# Patient Record
Sex: FEMALE | Race: WHITE | NOT HISPANIC OR LATINO | Employment: OTHER | URBAN - NONMETROPOLITAN AREA
[De-identification: names, ages, dates, MRNs, and addresses within clinical notes are randomized per-mention and may not be internally consistent; named-entity substitution may affect disease eponyms.]

---

## 2021-04-02 ENCOUNTER — OFFICE VISIT (OUTPATIENT)
Dept: SURGERY | Facility: CLINIC | Age: 61
End: 2021-04-02

## 2021-04-02 VITALS
TEMPERATURE: 98.4 F | SYSTOLIC BLOOD PRESSURE: 146 MMHG | HEIGHT: 65 IN | WEIGHT: 293 LBS | OXYGEN SATURATION: 94 % | DIASTOLIC BLOOD PRESSURE: 82 MMHG | HEART RATE: 116 BPM | BODY MASS INDEX: 48.82 KG/M2

## 2021-04-02 DIAGNOSIS — M67.40 GANGLION CYST: Primary | ICD-10-CM

## 2021-04-02 PROCEDURE — 99203 OFFICE O/P NEW LOW 30 MIN: CPT | Performed by: SURGERY

## 2021-04-02 RX ORDER — ESCITALOPRAM OXALATE 10 MG/1
TABLET ORAL
COMMUNITY

## 2021-04-02 RX ORDER — LIRAGLUTIDE 6 MG/ML
INJECTION, SOLUTION SUBCUTANEOUS
COMMUNITY

## 2021-04-02 RX ORDER — PILOCARPINE HYDROCHLORIDE 5 MG/1
TABLET, FILM COATED ORAL
COMMUNITY

## 2021-04-02 RX ORDER — SULFAMETHOXAZOLE AND TRIMETHOPRIM 800; 160 MG/1; MG/1
TABLET ORAL
COMMUNITY
Start: 2021-03-24

## 2021-04-02 RX ORDER — METHYLPREDNISOLONE 4 MG/1
TABLET ORAL
COMMUNITY
Start: 2021-02-15

## 2021-04-02 RX ORDER — SYRINGE AND NEEDLE,INSULIN,1ML 25GX1"
SYRINGE, EMPTY DISPOSABLE MISCELLANEOUS
COMMUNITY
Start: 2021-03-26

## 2021-04-02 RX ORDER — FUROSEMIDE 40 MG/1
TABLET ORAL
COMMUNITY

## 2021-04-02 RX ORDER — CETIRIZINE HYDROCHLORIDE 10 MG/1
TABLET ORAL
COMMUNITY

## 2021-04-02 RX ORDER — FOLIC ACID 1 MG/1
TABLET ORAL
COMMUNITY

## 2021-04-02 RX ORDER — ALPRAZOLAM 0.25 MG/1
TABLET ORAL
COMMUNITY

## 2021-04-02 RX ORDER — MONTELUKAST SODIUM 10 MG/1
TABLET ORAL
COMMUNITY

## 2021-04-02 RX ORDER — PREDNISONE 10 MG/1
TABLET ORAL
COMMUNITY
Start: 2021-01-04

## 2021-04-02 RX ORDER — HYDROXYCHLOROQUINE SULFATE 200 MG/1
TABLET, FILM COATED ORAL
COMMUNITY
Start: 2021-01-17

## 2021-04-02 RX ORDER — ESCITALOPRAM OXALATE 20 MG/1
TABLET ORAL
COMMUNITY

## 2021-04-02 RX ORDER — LOSARTAN POTASSIUM 50 MG/1
TABLET ORAL
COMMUNITY

## 2021-04-02 RX ORDER — NALTREXONE HYDROCHLORIDE AND BUPROPION HYDROCHLORIDE 8; 90 MG/1; MG/1
TABLET, EXTENDED RELEASE ORAL
COMMUNITY

## 2021-04-02 RX ORDER — CHOLECALCIFEROL (VITAMIN D3) 125 MCG
CAPSULE ORAL
COMMUNITY

## 2021-04-02 RX ORDER — METHOTREXATE 25 MG/ML
INJECTION INTRA-ARTERIAL; INTRAMUSCULAR; INTRATHECAL; INTRAVENOUS
COMMUNITY
Start: 2021-02-20

## 2021-04-02 RX ORDER — ATORVASTATIN CALCIUM 40 MG/1
TABLET, FILM COATED ORAL
COMMUNITY

## 2021-04-02 RX ORDER — INFLIXIMAB 100 MG/10ML
5 INJECTION, POWDER, LYOPHILIZED, FOR SOLUTION INTRAVENOUS
COMMUNITY

## 2021-06-02 ENCOUNTER — TRANSCRIBE ORDERS (OUTPATIENT)
Dept: ADMINISTRATIVE | Facility: HOSPITAL | Age: 61
End: 2021-06-02

## 2021-06-02 DIAGNOSIS — Z12.31 ENCOUNTER FOR SCREENING MAMMOGRAM FOR MALIGNANT NEOPLASM OF BREAST: Primary | ICD-10-CM

## 2021-07-15 ENCOUNTER — HOSPITAL ENCOUNTER (OUTPATIENT)
Dept: MAMMOGRAPHY | Facility: HOSPITAL | Age: 61
Discharge: HOME | End: 2021-07-15
Admitting: INTERNAL MEDICINE

## 2021-07-15 DIAGNOSIS — Z12.31 ENCOUNTER FOR SCREENING MAMMOGRAM FOR MALIGNANT NEOPLASM OF BREAST: ICD-10-CM

## 2021-07-15 PROCEDURE — 77067 SCR MAMMO BI INCL CAD: CPT

## 2021-07-15 PROCEDURE — 77063 BREAST TOMOSYNTHESIS BI: CPT

## 2022-10-03 ENCOUNTER — TRANSCRIBE ORDERS (OUTPATIENT)
Dept: ADMINISTRATIVE | Facility: HOSPITAL | Age: 62
End: 2022-10-03

## 2022-10-03 DIAGNOSIS — Z12.31 ENCOUNTER FOR SCREENING MAMMOGRAM FOR BREAST CANCER: Primary | ICD-10-CM

## 2023-05-09 ENCOUNTER — TRANSCRIBE ORDERS (OUTPATIENT)
Dept: ADMINISTRATIVE | Facility: HOSPITAL | Age: 63
End: 2023-05-09
Payer: COMMERCIAL

## 2023-05-09 DIAGNOSIS — N18.31 CHRONIC KIDNEY DISEASE (CKD) STAGE G3A/A1, MODERATELY DECREASED GLOMERULAR FILTRATION RATE (GFR) BETWEEN 45-59 ML/MIN/1.73 SQUARE METER AND ALBUMINURIA CREATININE RATIO LESS THAN 30 MG/G (CMS/H*: Primary | ICD-10-CM

## 2024-04-01 ENCOUNTER — TRANSCRIBE ORDERS (OUTPATIENT)
Dept: ADMINISTRATIVE | Facility: HOSPITAL | Age: 64
End: 2024-04-01
Payer: COMMERCIAL

## 2024-04-01 DIAGNOSIS — Z12.31 VISIT FOR SCREENING MAMMOGRAM: Primary | ICD-10-CM

## 2024-07-05 ENCOUNTER — HOSPITAL ENCOUNTER (OUTPATIENT)
Dept: MAMMOGRAPHY | Facility: HOSPITAL | Age: 64
Discharge: HOME | End: 2024-07-05
Admitting: INTERNAL MEDICINE
Payer: COMMERCIAL

## 2024-07-05 DIAGNOSIS — Z12.31 VISIT FOR SCREENING MAMMOGRAM: ICD-10-CM

## 2024-07-05 PROCEDURE — 77063 BREAST TOMOSYNTHESIS BI: CPT

## 2024-07-05 PROCEDURE — 77067 SCR MAMMO BI INCL CAD: CPT

## 2024-07-08 ENCOUNTER — TRANSCRIBE ORDERS (OUTPATIENT)
Dept: ADMINISTRATIVE | Facility: HOSPITAL | Age: 64
End: 2024-07-08
Payer: COMMERCIAL

## 2024-07-08 DIAGNOSIS — R92.8 ABNORMAL MAMMOGRAM: Primary | ICD-10-CM

## 2024-08-09 ENCOUNTER — HOSPITAL ENCOUNTER (OUTPATIENT)
Dept: MAMMOGRAPHY | Facility: HOSPITAL | Age: 64
Discharge: HOME | End: 2024-08-09
Admitting: INTERNAL MEDICINE
Payer: COMMERCIAL

## 2024-08-09 DIAGNOSIS — R92.8 ABNORMAL MAMMOGRAM: ICD-10-CM

## 2024-08-09 PROCEDURE — 77065 DX MAMMO INCL CAD UNI: CPT

## 2024-08-09 PROCEDURE — G0279 TOMOSYNTHESIS, MAMMO: HCPCS

## 2024-08-21 RX ORDER — HYDROXYCHLOROQUINE SULFATE 200 MG/1
200 TABLET, FILM COATED ORAL 2 TIMES DAILY
Qty: 60 TABLET | Refills: 3 | OUTPATIENT
Start: 2024-08-21

## 2024-12-23 RX ORDER — TRAMADOL HYDROCHLORIDE 50 MG/1
TABLET ORAL
Qty: 120 TABLET | OUTPATIENT
Start: 2024-12-23

## 2024-12-23 NOTE — TELEPHONE ENCOUNTER
Please call and schedule pt a F/U so we can refill RX. Please let us know in the clinical pool when you do.

## 2024-12-23 NOTE — TELEPHONE ENCOUNTER
I BELIEVE THIS PT SWITCHED RHEUMATOLOGIST.  THERE IS A BELLA FOR RECORDS TO GO TO [facility] FOR DR. FRANKLYN MURO.

## 2025-06-27 ENCOUNTER — OFFICE VISIT (OUTPATIENT)
Age: 65
End: 2025-06-27
Payer: COMMERCIAL

## 2025-06-27 VITALS
WEIGHT: 293 LBS | HEIGHT: 65 IN | SYSTOLIC BLOOD PRESSURE: 125 MMHG | TEMPERATURE: 98.2 F | DIASTOLIC BLOOD PRESSURE: 75 MMHG | OXYGEN SATURATION: 99 % | HEART RATE: 61 BPM | RESPIRATION RATE: 18 BRPM | BODY MASS INDEX: 48.82 KG/M2

## 2025-06-27 DIAGNOSIS — J01.00 ACUTE NON-RECURRENT MAXILLARY SINUSITIS: ICD-10-CM

## 2025-06-27 DIAGNOSIS — I10 PRIMARY HYPERTENSION: ICD-10-CM

## 2025-06-27 DIAGNOSIS — E55.9 VITAMIN D DEFICIENCY: ICD-10-CM

## 2025-06-27 DIAGNOSIS — Z90.3 HISTORY OF SLEEVE GASTRECTOMY: ICD-10-CM

## 2025-06-27 DIAGNOSIS — E66.9 OBESITY WITHOUT SERIOUS COMORBIDITY, UNSPECIFIED CLASS, UNSPECIFIED OBESITY TYPE: ICD-10-CM

## 2025-06-27 DIAGNOSIS — F41.8 MIXED ANXIETY AND DEPRESSIVE DISORDER: Primary | ICD-10-CM

## 2025-06-27 DIAGNOSIS — J30.9 ALLERGIC RHINITIS, UNSPECIFIED SEASONALITY, UNSPECIFIED TRIGGER: ICD-10-CM

## 2025-06-27 DIAGNOSIS — E78.2 MIXED HYPERLIPIDEMIA: ICD-10-CM

## 2025-06-27 DIAGNOSIS — M35.01 SJOGREN'S SYNDROME WITH KERATOCONJUNCTIVITIS SICCA: ICD-10-CM

## 2025-06-27 DIAGNOSIS — R73.03 PREDIABETES: ICD-10-CM

## 2025-06-27 DIAGNOSIS — D84.821 IMMUNOSUPPRESSION DUE TO DRUG THERAPY: ICD-10-CM

## 2025-06-27 DIAGNOSIS — G47.33 OBSTRUCTIVE SLEEP APNEA SYNDROME: ICD-10-CM

## 2025-06-27 DIAGNOSIS — R92.8 ABNORMAL MAMMOGRAM: ICD-10-CM

## 2025-06-27 DIAGNOSIS — Z79.899 IMMUNOSUPPRESSION DUE TO DRUG THERAPY: ICD-10-CM

## 2025-06-27 DIAGNOSIS — J45.20 MILD INTERMITTENT ASTHMA, UNSPECIFIED WHETHER COMPLICATED: ICD-10-CM

## 2025-06-27 DIAGNOSIS — N18.31 STAGE 3A CHRONIC KIDNEY DISEASE: ICD-10-CM

## 2025-06-27 PROBLEM — J45.909 ASTHMA: Status: ACTIVE | Noted: 2024-11-13

## 2025-06-27 PROBLEM — M35.00 SJOGREN'S SYNDROME: Status: ACTIVE | Noted: 2018-12-17

## 2025-06-27 PROBLEM — E78.5 HYPERLIPIDEMIA: Status: ACTIVE | Noted: 2020-11-25

## 2025-06-27 RX ORDER — FAMOTIDINE 40 MG/1
40 TABLET, FILM COATED ORAL DAILY
COMMUNITY

## 2025-06-27 RX ORDER — LEVOFLOXACIN 750 MG/1
750 TABLET, FILM COATED ORAL DAILY
Qty: 7 TABLET | Refills: 0 | Status: SHIPPED | OUTPATIENT
Start: 2025-06-27

## 2025-06-27 RX ORDER — LOSARTAN POTASSIUM 25 MG/1
12.5 TABLET ORAL DAILY
COMMUNITY

## 2025-06-27 RX ORDER — FAMOTIDINE 40 MG/5ML
20 POWDER, FOR SUSPENSION ORAL 2 TIMES DAILY
COMMUNITY
End: 2025-06-27

## 2025-06-27 RX ORDER — PILOCARPINE HYDROCHLORIDE 5 MG/1
5 TABLET, FILM COATED ORAL 3 TIMES DAILY PRN
COMMUNITY
Start: 2024-06-05 | End: 2025-06-27

## 2025-06-27 RX ORDER — ALPRAZOLAM 0.5 MG
0.5 TABLET ORAL NIGHTLY PRN
Qty: 60 TABLET | Refills: 2 | Status: SHIPPED | OUTPATIENT
Start: 2025-06-27

## 2025-06-27 NOTE — PROGRESS NOTES
[facility]     Chief Complaint  Med Refill (Sinus drainage and irritated throat x 4 weeks)    Beatris Momin is a 65 y.o. female who presents today to [facility] for a follow up visit for Med Refill (Sinus drainage and irritated throat x 4 weeks).    HPI:   She has had some sinus issues for the last months. She saw  and was put on Levaquin for 5 days. She has some sinus drainage, throat dryness, cough. She has not been short of breath or having fevers.     She has a Pyrinese puppy named Sydni. She has another new puppy who is too big and they will get rid of him.    She sees Dr. Otero on 7/11/25 and takes Rinvoq. She has RA and SSA. She is getting an MRI of her right shoulder soon. She has been doing well with this. She has not had to take steroids as well.       She had some low BP, she has been having some dizziness. She had bariatric surgery in Jan 2025. She has been eating a half a sandwich for lunch. She has a cooked meal for dinner.     She has been watching the grandchildren this summer and has been busy. She takes her Xanax at bedtime. She has had a hysterectomy.    She has had some asthma and allergies and she takes singulair and Zyrtec. She does IT and Trelegy.       Meds refill and chest congestion  Symptoms are: recurrent.   Onset was 1 to 6 months.   Symptoms occur: constantly.  Symptoms include: nasal congestion, cough and sore throat.   Pertinent negative symptoms include no abdominal pain, no anorexia, no joint pain, no change in stool, no chest pain, no chills, no diaphoresis, no fatigue, no fever, no headaches, no joint swelling, no myalgias, no nausea, no neck pain, no numbness, no rash, no swollen glands, no dysuria, no vertigo, no visual change, no vomiting and no weakness.   Treatment and/or Medications comments include: Robitussin,  antibiotic        Previous History:   Past Medical History:   Diagnosis Date    Allergic     Rheumatoid arthritis      Sjogren syndrome       Past Surgical History:   Procedure Laterality Date    OOPHORECTOMY        Social History     Socioeconomic History    Marital status:    Tobacco Use    Smoking status: Never     Passive exposure: Never    Smokeless tobacco: Never   Vaping Use    Vaping status: Never Used   Substance and Sexual Activity    Alcohol use: Never    Drug use: Never    Sexual activity: Defer      Health Maintenance Due   Topic Date Due    PAP SMEAR  Never done    Pneumococcal Vaccine 50+ (2 of 2 - PPSV23) 07/01/2016    ANNUAL PHYSICAL  Never done    TDAP/TD VACCINES (2 - Td or Tdap) 11/21/2023    COVID-19 Vaccine (5 - 2024-25 season) 09/01/2024    COLORECTAL CANCER SCREENING  09/23/2025        Current Medications:  Current Outpatient Medications   Medication Sig Dispense Refill    ALPRAZolam (XANAX) 0.5 MG tablet Take 1 tablet by mouth At Night As Needed for Anxiety. 60 tablet 2    atorvastatin (LIPITOR) 40 MG tablet atorvastatin 40 mg tablet   TAKE 1 TABLET BY MOUTH EVERY DAY      escitalopram (LEXAPRO) 20 MG tablet escitalopram 20 mg tablet   TAKE 1 TABLET BY MOUTH EVERY DAY      famotidine (PEPCID) 40 MG tablet Take 1 tablet by mouth Daily.      hydroxychloroquine (PLAQUENIL) 200 MG tablet       linaclotide (LINZESS) 290 MCG capsule capsule Take 1 capsule by mouth Every Morning Before Breakfast.      losartan (COZAAR) 25 MG tablet Take 0.5 tablets by mouth Daily.      montelukast (SINGULAIR) 10 MG tablet montelukast 10 mg tablet   TAKE 1 TABLET BY MOUTH EVERY DAY      pilocarpine (SALAGEN) 5 MG tablet pilocarpine 5 mg tablet      cetirizine (zyrTEC) 10 MG tablet cetirizine 10 mg tablet   TAKE 1 TABLET BY MOUTH EVERY DAY      Cholecalciferol 50 MCG (2000 UT) tablet Vitamin D3 50 mcg (2,000 unit) tablet   TAKE 1 TABLET BY MOUTH EVERY DAY      levoFLOXacin (LEVAQUIN) 750 MG tablet Take 1 tablet by mouth Daily. 7 tablet 0    melatonin 5 MG tablet tablet melatonin 5 mg tablet   Take 1 tablet every day by oral  "route as needed.       No current facility-administered medications for this visit.       Allergies:   Allergies   Allergen Reactions    Doxycycline Hives and Swelling    Hydrochlorothiazide Other (See Comments)    Penicillins Rash    Erythromycin Unknown - High Severity    Cefaclor Hives and Rash    Lisinopril Cough, Other (See Comments) and Rash     COUGH       Vitals:   /75   Pulse 61   Temp 98.2 °F (36.8 °C) (Oral)   Resp 18   Ht 165.1 cm (65\")   Wt (!) 145 kg (319 lb)   SpO2 99%   BMI 53.08 kg/m²   Estimated body mass index is 53.08 kg/m² as calculated from the following:    Height as of this encounter: 165.1 cm (65\").    Weight as of this encounter: 145 kg (319 lb).    Beatris Momin  reports that she has never smoked. She has never been exposed to tobacco smoke. She has never used smokeless tobacco.       Physical Exam:   Physical Exam  Constitutional:       Appearance: Normal appearance. She is obese.   HENT:      Head: Normocephalic and atraumatic.      Right Ear: Tympanic membrane, ear canal and external ear normal.      Left Ear: Tympanic membrane, ear canal and external ear normal.   Eyes:      Extraocular Movements: Extraocular movements intact.      Conjunctiva/sclera: Conjunctivae normal.   Cardiovascular:      Rate and Rhythm: Normal rate and regular rhythm.      Pulses: Normal pulses.      Heart sounds: Normal heart sounds.   Pulmonary:      Effort: Pulmonary effort is normal.      Breath sounds: Normal breath sounds.   Abdominal:      General: Abdomen is flat. Bowel sounds are normal.      Palpations: Abdomen is soft.   Skin:     General: Skin is warm and dry.   Neurological:      General: No focal deficit present.      Mental Status: She is alert. Mental status is at baseline.          Lab Results:   No visits with results within 3 Month(s) from this visit.   Latest known visit with results is:   No results found for any previous visit.       Results review: During today's encounter, " "all relevant clinical data has been reviewed.      Assessment and Plan  Diagnoses and all orders for this visit:    1. Mixed anxiety and depressive disorder (Primary)  -     ALPRAZolam (XANAX) 0.5 MG tablet; Take 1 tablet by mouth At Night As Needed for Anxiety.  Dispense: 60 tablet; Refill: 2    2. Acute non-recurrent maxillary sinusitis    3. Primary hypertension    4. History of sleeve gastrectomy    5. Prediabetes    6. Stage 3a chronic kidney disease    7. Sjogren's syndrome with keratoconjunctivitis sicca    8. Mild intermittent asthma, unspecified whether complicated    9. Obstructive sleep apnea syndrome    10. Immunosuppression due to drug therapy    11. Vitamin D deficiency    12. Obesity without serious comorbidity, unspecified class, unspecified obesity type    13. Mixed hyperlipidemia    14. Allergic rhinitis, unspecified seasonality, unspecified trigger    15. Abnormal mammogram  -     Mammo Diagnostic Digital Tomosynthesis Bilateral With CAD; Future  -     US Breast Bilateral Limited; Future    Other orders  -     levoFLOXacin (LEVAQUIN) 750 MG tablet; Take 1 tablet by mouth Daily.  Dispense: 7 tablet; Refill: 0        BMI cannot be calculated due to outdated height or weight values.  Please input a current height/weight in Vitals and re-renter BMIFOLLOWUP in Note to pull in correct documentation based on BMI range.       New Medications:   New Medications Ordered This Visit   Medications    ALPRAZolam (XANAX) 0.5 MG tablet     Sig: Take 1 tablet by mouth At Night As Needed for Anxiety.     Dispense:  60 tablet     Refill:  2    levoFLOXacin (LEVAQUIN) 750 MG tablet     Sig: Take 1 tablet by mouth Daily.     Dispense:  7 tablet     Refill:  0       Discontinued Medications:   Medications Discontinued During This Encounter   Medication Reason    pilocarpine (SALAGEN) 5 MG tablet *Error    escitalopram (LEXAPRO) 10 MG tablet *Therapy completed    B-D INSULIN SYRINGE 1CC/25G 25G X 5/8\" 1 ML misc *Therapy " completed    Pseudoephedrine-Naproxen Na (ALEVE-D SINUS & COLD PO) *Therapy completed    sulfamethoxazole-trimethoprim (BACTRIM DS,SEPTRA DS) 800-160 MG per tablet *Therapy completed    predniSONE (DELTASONE) 10 MG tablet *Therapy completed    naltrexone-bupropion ER (Contrave) 8-90 MG tablet *Therapy completed    methylPREDNISolone (MEDROL) 4 MG dose pack *Therapy completed    Methotrexate Sodium (methotrexate PF) 50 MG/2ML chemo syringe *Therapy completed    losartan (COZAAR) 50 MG tablet *Therapy completed    Liraglutide (Saxenda) 18 MG/3ML injection pen *Therapy completed    inFLIXimab (REMICADE) 100 MG injection *Therapy completed    furosemide (Lasix) 40 MG tablet *Therapy completed    folic acid (FOLVITE) 1 MG tablet *Therapy completed    famotidine (PEPCID) 40 mg/5 mL suspension *Therapy completed    LOSARTAN POTASSIUM PO *Therapy completed    ALPRAZolam (XANAX) 0.25 MG tablet Reorder              Visit Diagnoses:    ICD-10-CM ICD-9-CM   1. Mixed anxiety and depressive disorder  F41.8 300.4   2. Acute non-recurrent maxillary sinusitis  J01.00 461.0   3. Primary hypertension  I10 401.9   4. History of sleeve gastrectomy  Z90.3 V15.29   5. Prediabetes  R73.03 790.29   6. Stage 3a chronic kidney disease  N18.31 585.3   7. Sjogren's syndrome with keratoconjunctivitis sicca  M35.01 710.2   8. Mild intermittent asthma, unspecified whether complicated  J45.20 493.90   9. Obstructive sleep apnea syndrome  G47.33 327.23   10. Immunosuppression due to drug therapy  D84.821 V58.69    Z79.899    11. Vitamin D deficiency  E55.9 268.9   12. Obesity without serious comorbidity, unspecified class, unspecified obesity type  E66.9 278.00   13. Mixed hyperlipidemia  E78.2 272.2   14. Allergic rhinitis, unspecified seasonality, unspecified trigger  J30.9 477.9   15. Abnormal mammogram  R92.8 793.80            Acute sinusitis  Will start on Levaquin  Call back if not improved    Abnormal mammogram  Will get repeat mammogram and   US    Obesity/prediabetes  S/p bariatric surgery  Discussed lifestyle modification including healthy diet and regular exercise    Depression and anxiety  Well controlled  Continue Lexapro and PRN Xanax. Continue melatonin for sleep  This is in the best interest of the patient, she is a low risk for diversion or misuse. William reviewed and appropriate.  Follow up 3 months    Hypertension  Well controlled   Labs okay 6/25  Continue current medications- Losartan 12.5mg  Follow up 3 months    Hyperlipidemia  Well controlled  Labs 6/25  Continue current medication - Lipitor    Rheumatoid arthritis  Continue Rinvoq  Follows with Dr. Otero    SERGIO  Compliant with CPAP    Asthma and allergies  Continue singulair and Zyrtec  Continue Trelegy  Now on IT with allergy    HM  Mammogram 8/24  Colon 9/15 FU 10 years  Has had hysterectomy so no pap        Follow Up:   Return in about 3 months (around 9/27/2025).    Patient was given instructions and counseling regarding her condition or for health maintenance advice. Please see specific information pulled into the AVS if appropriate.       This document has been electronically signed by Dia Bonds MD   June 27, 2025 15:21 EDT    Dictated Utilizing Dragon Dictation: Part of this note may be an electronic transcription/translation of spoken language to printed text using the Dragon Dictation System.

## 2025-07-17 ENCOUNTER — TELEPHONE (OUTPATIENT)
Age: 65
End: 2025-07-17
Payer: COMMERCIAL

## 2025-07-17 NOTE — TELEPHONE ENCOUNTER
7/17/2025 @ 1451- Spoke with patient regarding message sent.  Advised patient to contact her nephrologist office as this is a new diagnosis for her from renal standpoint.  I reviewed previous office note and this diagnosis is not listed per patients medical record.  No note on file from nephrologist at this time to review.  Will send for records for this patient from Dr. Parra in [place], [place] Advised patient if she could not get into contact with his office to call us back and we will get in touch with his office for her.  Patient voiced no acute needs at this time.- CH

## 2025-08-07 DIAGNOSIS — F41.8 MIXED ANXIETY AND DEPRESSIVE DISORDER: ICD-10-CM

## 2025-08-07 RX ORDER — ALPRAZOLAM 0.5 MG
0.5 TABLET ORAL 2 TIMES DAILY PRN
Qty: 60 TABLET | Refills: 2 | Status: SHIPPED | OUTPATIENT
Start: 2025-08-07